# Patient Record
Sex: MALE | Race: WHITE | NOT HISPANIC OR LATINO | URBAN - METROPOLITAN AREA
[De-identification: names, ages, dates, MRNs, and addresses within clinical notes are randomized per-mention and may not be internally consistent; named-entity substitution may affect disease eponyms.]

---

## 2017-10-31 ENCOUNTER — INPATIENT (INPATIENT)
Facility: HOSPITAL | Age: 27
LOS: 2 days | Discharge: HOME | DRG: 885 | End: 2017-11-03
Attending: STUDENT IN AN ORGANIZED HEALTH CARE EDUCATION/TRAINING PROGRAM | Admitting: STUDENT IN AN ORGANIZED HEALTH CARE EDUCATION/TRAINING PROGRAM
Payer: COMMERCIAL

## 2017-10-31 VITALS
HEART RATE: 81 BPM | SYSTOLIC BLOOD PRESSURE: 117 MMHG | OXYGEN SATURATION: 97 % | RESPIRATION RATE: 18 BRPM | DIASTOLIC BLOOD PRESSURE: 80 MMHG | TEMPERATURE: 98 F

## 2017-10-31 DIAGNOSIS — F14.10 COCAINE ABUSE, UNCOMPLICATED: ICD-10-CM

## 2017-10-31 DIAGNOSIS — F11.90 OPIOID USE, UNSPECIFIED, UNCOMPLICATED: ICD-10-CM

## 2017-10-31 DIAGNOSIS — R69 ILLNESS, UNSPECIFIED: ICD-10-CM

## 2017-10-31 DIAGNOSIS — F33.2 MAJOR DEPRESSIVE DISORDER, RECURRENT SEVERE WITHOUT PSYCHOTIC FEATURES: ICD-10-CM

## 2017-10-31 DIAGNOSIS — F10.99 ALCOHOL USE, UNSPECIFIED WITH UNSPECIFIED ALCOHOL-INDUCED DISORDER: ICD-10-CM

## 2017-10-31 LAB
ANION GAP SERPL CALC-SCNC: 9 MMOL/L — SIGNIFICANT CHANGE UP (ref 5–17)
APAP SERPL-MCNC: <15 UG/ML — SIGNIFICANT CHANGE UP (ref 10–30)
APPEARANCE UR: CLEAR — SIGNIFICANT CHANGE UP
BASOPHILS NFR BLD AUTO: 0.2 % — SIGNIFICANT CHANGE UP (ref 0–2)
BILIRUB UR-MCNC: NEGATIVE — SIGNIFICANT CHANGE UP
BUN SERPL-MCNC: 12 MG/DL — SIGNIFICANT CHANGE UP (ref 7–23)
CALCIUM SERPL-MCNC: 8.4 MG/DL — SIGNIFICANT CHANGE UP (ref 8.4–10.5)
CHLORIDE SERPL-SCNC: 103 MMOL/L — SIGNIFICANT CHANGE UP (ref 96–108)
CO2 SERPL-SCNC: 24 MMOL/L — SIGNIFICANT CHANGE UP (ref 22–31)
COLOR SPEC: YELLOW — SIGNIFICANT CHANGE UP
CREAT SERPL-MCNC: 0.76 MG/DL — SIGNIFICANT CHANGE UP (ref 0.5–1.3)
DIFF PNL FLD: NEGATIVE — SIGNIFICANT CHANGE UP
EOSINOPHIL NFR BLD AUTO: 0.7 % — SIGNIFICANT CHANGE UP (ref 0–6)
ETHANOL SERPL-MCNC: <10 MG/DL — SIGNIFICANT CHANGE UP (ref 0–10)
GLUCOSE SERPL-MCNC: 97 MG/DL — SIGNIFICANT CHANGE UP (ref 70–99)
GLUCOSE UR QL: NEGATIVE — SIGNIFICANT CHANGE UP
HCT VFR BLD CALC: 38.4 % — LOW (ref 39–50)
HGB BLD-MCNC: 12.6 G/DL — LOW (ref 13–17)
KETONES UR-MCNC: NEGATIVE — SIGNIFICANT CHANGE UP
LEUKOCYTE ESTERASE UR-ACNC: NEGATIVE — SIGNIFICANT CHANGE UP
LYMPHOCYTES # BLD AUTO: 21.1 % — SIGNIFICANT CHANGE UP (ref 13–44)
MCHC RBC-ENTMCNC: 29 PG — SIGNIFICANT CHANGE UP (ref 27–34)
MCHC RBC-ENTMCNC: 32.8 G/DL — SIGNIFICANT CHANGE UP (ref 32–36)
MCV RBC AUTO: 88.5 FL — SIGNIFICANT CHANGE UP (ref 80–100)
MONOCYTES NFR BLD AUTO: 16.3 % — HIGH (ref 2–14)
NEUTROPHILS NFR BLD AUTO: 61.7 % — SIGNIFICANT CHANGE UP (ref 43–77)
NITRITE UR-MCNC: NEGATIVE — SIGNIFICANT CHANGE UP
PCP SPEC-MCNC: SIGNIFICANT CHANGE UP
PH UR: 6 — SIGNIFICANT CHANGE UP (ref 5–8)
PLATELET # BLD AUTO: 262 K/UL — SIGNIFICANT CHANGE UP (ref 150–400)
POTASSIUM SERPL-MCNC: 3.9 MMOL/L — SIGNIFICANT CHANGE UP (ref 3.5–5.3)
POTASSIUM SERPL-SCNC: 3.9 MMOL/L — SIGNIFICANT CHANGE UP (ref 3.5–5.3)
PROT UR-MCNC: NEGATIVE MG/DL — SIGNIFICANT CHANGE UP
RBC # BLD: 4.34 M/UL — SIGNIFICANT CHANGE UP (ref 4.2–5.8)
RBC # FLD: 14.1 % — SIGNIFICANT CHANGE UP (ref 10.3–16.9)
SALICYLATES SERPL-MCNC: <0.3 MG/DL — LOW (ref 2.8–20)
SODIUM SERPL-SCNC: 136 MMOL/L — SIGNIFICANT CHANGE UP (ref 135–145)
SP GR SPEC: 1.02 — SIGNIFICANT CHANGE UP (ref 1–1.03)
TSH SERPL-MCNC: 0.24 UIU/ML — LOW (ref 0.35–4.94)
UROBILINOGEN FLD QL: 0.2 E.U./DL — SIGNIFICANT CHANGE UP
WBC # BLD: 5.8 K/UL — SIGNIFICANT CHANGE UP (ref 3.8–10.5)
WBC # FLD AUTO: 5.8 K/UL — SIGNIFICANT CHANGE UP (ref 3.8–10.5)

## 2017-10-31 PROCEDURE — 99285 EMERGENCY DEPT VISIT HI MDM: CPT | Mod: 25

## 2017-10-31 PROCEDURE — 93010 ELECTROCARDIOGRAM REPORT: CPT

## 2017-10-31 PROCEDURE — 90792 PSYCH DIAG EVAL W/MED SRVCS: CPT

## 2017-10-31 RX ORDER — ACETAMINOPHEN 500 MG
650 TABLET ORAL EVERY 6 HOURS
Qty: 0 | Refills: 0 | Status: DISCONTINUED | OUTPATIENT
Start: 2017-10-31 | End: 2017-11-03

## 2017-10-31 RX ORDER — DIPHENHYDRAMINE HCL 50 MG
50 CAPSULE ORAL AT BEDTIME
Qty: 0 | Refills: 0 | Status: DISCONTINUED | OUTPATIENT
Start: 2017-10-31 | End: 2017-11-03

## 2017-10-31 RX ORDER — QUETIAPINE FUMARATE 200 MG/1
50 TABLET, FILM COATED ORAL EVERY 4 HOURS
Qty: 0 | Refills: 0 | Status: DISCONTINUED | OUTPATIENT
Start: 2017-10-31 | End: 2017-10-31

## 2017-10-31 RX ORDER — DIPHENHYDRAMINE HCL 50 MG
50 CAPSULE ORAL EVERY 4 HOURS
Qty: 0 | Refills: 0 | Status: DISCONTINUED | OUTPATIENT
Start: 2017-10-31 | End: 2017-10-31

## 2017-10-31 RX ORDER — QUETIAPINE FUMARATE 200 MG/1
50 TABLET, FILM COATED ORAL EVERY 4 HOURS
Qty: 0 | Refills: 0 | Status: DISCONTINUED | OUTPATIENT
Start: 2017-10-31 | End: 2017-11-03

## 2017-10-31 RX ADMIN — Medication 650 MILLIGRAM(S): at 22:19

## 2017-10-31 RX ADMIN — Medication 50 MILLIGRAM(S): at 22:19

## 2017-10-31 NOTE — ED BEHAVIORAL HEALTH ASSESSMENT NOTE - DESCRIPTION (FIRST USE, LAST USE, QUANTITY, FREQUENCY, DURATION)
Current smoker. Approached by supriya HANNAH regarding possible nicotine patch or gum. First drank ~15 years ago. Reports last drink was ~2 months ago (prior to rehab). Could drink up to 1 fifth of vodka daily. First used cocaine ~10 years ago, crack ~2 years ago. Reported last use was a few days ago. IVDA since ~1 year ago. Could use a few bags/day. Reported last use ~2 months ago (prior to rehab).

## 2017-10-31 NOTE — ED BEHAVIORAL HEALTH ASSESSMENT NOTE - AXIS IV
Problem related to social environment/Occupational problems/Housing problems/Problems with primary support/Problems with access to healthcare services

## 2017-10-31 NOTE — ED BEHAVIORAL HEALTH ASSESSMENT NOTE - LEGAL HISTORY
1 year in senior living ([facility]) initially for charge of burglary. Pt reports, "Everything was dismissed and the charges were downgraded to burglary.)

## 2017-10-31 NOTE — ED BEHAVIORAL HEALTH ASSESSMENT NOTE - SUMMARY
27-year-old English-born male, undomiciled, unemployed, who reports hx of depression, polysubstance use, 1 prior inpt psychiatric admission and 1 prior suicide attempt, now c/o suicidal ideation with plan to jump in front of subway train, as well as homicidal ideation with thoughts of pushing others in front of the train, in context of noncompliance with medication regimen (unknown time period), recent cocaine use and psychosocial stressors of homelessness and unemployment.    Pt's current mental status may be partially or more significantly substance-induced. In addition, pt's lack of collateral informants, inability/unwillingness to provide complete hx may be consistent with antisocial personality traits and/or malingering.    For that reason, will providing prn medications for irritability and/or combativeness, with plan to add mood stabilizer if indicated.

## 2017-10-31 NOTE — ED BEHAVIORAL HEALTH ASSESSMENT NOTE - RISK ASSESSMENT
Pt with suicidal and homicidal ideation regarding the subway. Wants to be admitted. Pt is irritable but not threatening. Is at low to moderate risk of harm to self or others on inpt unit.

## 2017-10-31 NOTE — ED BEHAVIORAL HEALTH ASSESSMENT NOTE - PSYCHIATRIC ISSUES AND PLAN (INCLUDE STANDING AND PRN MEDICATION)
Observe for need for standing rx (antidepressants, antipsychotics, mood stabilizers). For now, prn's of seroquel, lorazepam, benadryl.

## 2017-10-31 NOTE — ED ADULT NURSE NOTE - OBJECTIVE STATEMENT
Pt presents to ER c/o fever and body aches. Pt reports SI, denies HI. Vitals WNL, Pt is sleeping, appears comfortable. Given warm blankets. Pt was seen at a different ER approx 3 days ago for similar complaint. Will monitor and maintain safety.

## 2017-10-31 NOTE — ED ADULT NURSE NOTE - CHIEF COMPLAINT QUOTE
pt with fever, body aches reports was seen at [place] 3 days ago for the same thing. Now endorses SI with plan to jump in fron tof a train but decided not to.

## 2017-10-31 NOTE — ED ADULT TRIAGE NOTE - CHIEF COMPLAINT QUOTE
pt with fever, body aches reports was seen at [place] 3 days ago for the same thing. Now endorses SI with plan to jump in fron tof a train but decided not to.

## 2017-10-31 NOTE — ED BEHAVIORAL HEALTH ASSESSMENT NOTE - DIFFERENTIAL
MDD, recurrent, severe, without psychosis  Substance-Induced Mood Disorder  Polysubstance Dependence  Antisocial Personality Disorder

## 2017-10-31 NOTE — ED BEHAVIORAL HEALTH ASSESSMENT NOTE - OTHER PAST PSYCHIATRIC HISTORY (INCLUDE DETAILS REGARDING ONSET, COURSE OF ILLNESS, INPATIENT/OUTPATIENT TREATMENT)
As above.  Reports 1 prior inpt admt in [facility] ([place]) a few years ago. Rx'ed with Wellbutrin, Trazodone, Ativan prn. Did not follow up after discharge.  Reports 1 prior SA (tried to hang himself) a couple of years ago. As above.  Reports 1 prior inpt admt in [facility] ([place]) a few years ago. Rx'ed with Wellbutrin, Trazodone, Ativan prn. Did not follow up after discharge.  Also reports prior rx with seroquel and Prozac  Reports 1 prior SA (tried to hang himself) a couple of years ago. As above.  In addition to above hx, pt reports he has experienced panic attacks in the past.  Reports 1 prior inpt admt in [facility] ([facility]) a few years ago. Rx'ed with Wellbutrin, Trazodone, Ativan prn. Did not follow up after discharge.  Also reports prior rx with seroquel and Prozac.  Reports 1 prior SA (tried to hang himself) a couple of years ago.

## 2017-10-31 NOTE — ED BEHAVIORAL HEALTH ASSESSMENT NOTE - HPI (INCLUDE ILLNESS QUALITY, SEVERITY, DURATION, TIMING, CONTEXT, MODIFYING FACTORS, ASSOCIATED SIGNS AND SYMPTOMS)
Briefly, pt is 27-year-old English-born male, undomiciled, unemployed, who reports hx of depression, polysubstance use, 1 prior inpt psychiatric admission and 1 prior suicide attempt, now c/o suicidal ideation with plan to jump in front of subway train, as well as homicidal ideation with thoughts of pushing others in front of the train.  Pt, who is very irritable on approach, endorses Briefly, pt is 27-year-old English-born male, undomiciled, unemployed, who reports hx of depression, polysubstance use, 1 prior inpt psychiatric admission and 1 prior suicide attempt, now c/o suicidal ideation with plan to jump in front of subway train, as well as homicidal ideation with thoughts of pushing others in front of the train.  Pt, who is very irritable on approach. In addition to suicidal ideation with plan, endorses multiple sx of depression including hopelessness, depressed mood, anhedonia, anergy, decreased concentration, hypersomnia, but denies appetite problems or tearfulness.  Pt reports he's been homeless for 1 year. Also reports he hasn't worked since losing his job (in [place]) ~2 years ago. Briefly, pt is 27-year-old English-born male, undomiciled, unemployed, who reports hx of depression, polysubstance use, 1 prior inpt psychiatric admission and 1 prior suicide attempt, now c/o suicidal ideation with plan to jump in front of subway train, as well as homicidal ideation with thoughts of pushing others in front of the train.    Pt is very irritable on approach, minimally engageable. At one point during interview, he gritted his teeth and said, "How many of these stupid questions do I need to answer?!?!" When informed that questions are required for proper evaluation and possible admission, pt reluctantly complied with rest of interview.    In addition to suicidal ideation with plan, endorses multiple sx of depression including hopelessness, depressed mood, anhedonia, anergy, decreased concentration, hypersomnia, but denies appetite problems or tearfulness.    Pt reports he's been homeless for 1 year. Also reports he hasn't worked since losing his job (in [place]) ~2 years ago. Pt states he has no friends, no family, thus cannot provide any contacts for collateral information.    Pt reports he was in 28-day rehab at [facility] ~2 months ago, states he has been essentially clean and sober since then, but then added, "I did use 1 line of coke a few days ago." (N.B.: U tox + for cocaine and benzos.)

## 2017-10-31 NOTE — ED PROVIDER NOTE - ATTENDING CONTRIBUTION TO CARE
27 M hx of mental health d/o, incarceration in past BIBEMS after patient called for malaise but also wants to kill self.  Patient is homeless and is depressed with plan to jump off building.  Pt does not want to cooperate with hx and pe.  Just wants to sleep.  Exam unremarkable.  + SI.  Plan labs, urine, psych consult and reassess need for psych admission and or acute medical condition present.

## 2017-10-31 NOTE — ED BEHAVIORAL HEALTH ASSESSMENT NOTE - DESCRIPTION
Sitting in chair, eating N/A Born in [place]. Born in [place]. High school graduate; did not attend university. Reports his parents disowned him. 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 Has not worked x 2 years.

## 2017-10-31 NOTE — ED PROVIDER NOTE - OBJECTIVE STATEMENT
26 y/o m presents stating he has been having body aches, chills for the past day, also having thoughts of committing suicide.  Pt stating he has been incarcerated recently, is depressed, currently homeless, thinking of jumping off of a building.  Pt denies fever, n/v, CP, SOB, all other ROS negative.

## 2017-10-31 NOTE — ED PROVIDER NOTE - MEDICAL DECISION MAKING DETAILS
26 y/o m reporting nonspecific body aches, is SI; labs wnl, vss, afebrile, no evidence of infection, pt cleared medically.  Psych evaluated, will admit.

## 2017-11-01 DIAGNOSIS — Z76.5 MALINGERER [CONSCIOUS SIMULATION]: ICD-10-CM

## 2017-11-01 PROCEDURE — 99223 1ST HOSP IP/OBS HIGH 75: CPT

## 2017-11-01 RX ORDER — RISPERIDONE 4 MG/1
1 TABLET ORAL ONCE
Qty: 0 | Refills: 0 | Status: COMPLETED | OUTPATIENT
Start: 2017-11-01 | End: 2017-11-01

## 2017-11-01 RX ORDER — LOPERAMIDE HCL 2 MG
2 TABLET ORAL
Qty: 0 | Refills: 0 | Status: DISCONTINUED | OUTPATIENT
Start: 2017-11-01 | End: 2017-11-03

## 2017-11-01 RX ORDER — RISPERIDONE 4 MG/1
1 TABLET ORAL
Qty: 0 | Refills: 0 | Status: DISCONTINUED | OUTPATIENT
Start: 2017-11-01 | End: 2017-11-02

## 2017-11-01 RX ORDER — HALOPERIDOL DECANOATE 100 MG/ML
5 INJECTION INTRAMUSCULAR EVERY 6 HOURS
Qty: 0 | Refills: 0 | Status: DISCONTINUED | OUTPATIENT
Start: 2017-11-01 | End: 2017-11-03

## 2017-11-01 RX ORDER — NICOTINE POLACRILEX 2 MG
2 GUM BUCCAL
Qty: 0 | Refills: 0 | Status: DISCONTINUED | OUTPATIENT
Start: 2017-11-01 | End: 2017-11-03

## 2017-11-01 RX ADMIN — Medication 2 MILLIGRAM(S): at 18:37

## 2017-11-01 RX ADMIN — Medication 2 MILLIGRAM(S): at 22:35

## 2017-11-01 RX ADMIN — Medication 2 MILLIGRAM(S): at 23:50

## 2017-11-01 RX ADMIN — QUETIAPINE FUMARATE 50 MILLIGRAM(S): 200 TABLET, FILM COATED ORAL at 22:34

## 2017-11-01 NOTE — BEHAVIORAL HEALTH ASSESSMENT NOTE - NSBHCHARTREVIEWLAB_PSY_A_CORE FT
Urinalysis Basic - ( 31 Oct 2017 13:57 )    Color: Yellow / Appearance: Clear / S.020 / pH: x  Gluc: x / Ketone: NEGATIVE  / Bili: Negative / Urobili: 0.2 E.U./dL   Blood: x / Protein: NEGATIVE mg/dL / Nitrite: NEGATIVE   Leuk Esterase: NEGATIVE / RBC: x / WBC x   Sq Epi: x / Non Sq Epi: x / Bacteria: x    TSH 0.237  Utox +benzo, +cocaine
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
## 2017-11-01 NOTE — BEHAVIORAL HEALTH ASSESSMENT NOTE - NSBHCHARTREVIEWVS_PSY_A_CORE FT
Vital Signs Last 24 Hrs  T(C): 36.6 (01 Nov 2017 06:26), Max: 36.6 (01 Nov 2017 06:26)  T(F): 97.8 (01 Nov 2017 06:26), Max: 97.8 (01 Nov 2017 06:26)  HR: 71 (01 Nov 2017 06:26) (71 - 71)  BP: 105/65 (01 Nov 2017 06:26) (105/65 - 105/65)  BP(mean): --  RR: 18 (01 Nov 2017 06:26) (18 - 18)  SpO2: --

## 2017-11-01 NOTE — BEHAVIORAL HEALTH ASSESSMENT NOTE - HPI (INCLUDE ILLNESS QUALITY, SEVERITY, DURATION, TIMING, CONTEXT, MODIFYING FACTORS, ASSOCIATED SIGNS AND SYMPTOMS)
Patient is a 27 year old  male undomiciled, unemployed, native to [place], but last domiciled in [place] 2 years ago. He reports psychiatric history of depression, polysubstance use, panic attacks and ADHD. Patient self-presented to ED with suicidal ideation with plan to jump in front of a train and homicidal ideation with thoughts of pushing others in front of a train.    Patient is noted to be extremely irritable, several attempts were made to speak to patient. He states that he has been feeling depressed for 'a long time.' He says "When I feel depressed, I get withdrawn and I isolate." He reported that he was last on Wellbutrin 100mg BID, Trazodone, Ativan and Adderall over two months ago. He reported recently using cocaine prior to admission. His utox was also positive for benzodiazepines and he reported that he was recently hospitalized at either [facility] or [facility] and given Librium there. He reported not being on any other medications during his hospitalization. He began irate when additional questions were asked and walked out of the interview.     Per ED admission note, patient was also noted to be very irritable, though somewhat cooperative. He endorsed symptoms of depression including feelings of hopelessness, low energy, poor concentration and hypersomnia. He has no contacts or sources of support. He also stated that he was in a 28 day day rehab at [facility] 2 months ago and recently did one line of cocaine a few days ago.

## 2017-11-01 NOTE — BEHAVIORAL HEALTH ASSESSMENT NOTE - DESCRIPTION (FIRST USE, LAST USE, QUANTITY, FREQUENCY, DURATION)
First drank ~15 years ago. Reports last drink was ~2 months ago (prior to rehab). Could drink up to 1 fifth of vodka daily. First used cocaine ~10 years ago, crack ~2 years ago. Reported last use was a few days ago. IVDA since ~1 year ago. Could use a few bags/day. Reported last use ~2 months ago (prior to rehab). Per staff report, patient is a smoker

## 2017-11-01 NOTE — BEHAVIORAL HEALTH ASSESSMENT NOTE - SUMMARY
27-year-old English-born male, undomiciled, unemployed, who reports hx of depression, polysubstance use, 1 prior inpt psychiatric admission and 1 prior suicide attempt, now c/o suicidal ideation with plan to jump in front of subway train, as well as homicidal ideation with thoughts of pushing others in front of the train, in context of noncompliance with medication regimen (unknown time period), recent cocaine use and psychosocial stressors of homelessness and unemployment.    Patient is extremely irritable and uncooperative with interview. Will start Risperdal 1mg BID for mood stabilization and irritability. Will assess and evaluate tomorrow.

## 2017-11-01 NOTE — BEHAVIORAL HEALTH ASSESSMENT NOTE - TREATMENT
[facility] 28-day rehab ~2 months ago [facility] 28-day rehab ~2 months ago [facility] 28-day rehab ~2 months ago

## 2017-11-02 VITALS
TEMPERATURE: 98 F | SYSTOLIC BLOOD PRESSURE: 85 MMHG | RESPIRATION RATE: 18 BRPM | HEART RATE: 78 BPM | DIASTOLIC BLOOD PRESSURE: 50 MMHG

## 2017-11-02 DIAGNOSIS — F19.94 OTHER PSYCHOACTIVE SUBSTANCE USE, UNSPECIFIED WITH PSYCHOACTIVE SUBSTANCE-INDUCED MOOD DISORDER: ICD-10-CM

## 2017-11-02 PROCEDURE — 80048 BASIC METABOLIC PNL TOTAL CA: CPT

## 2017-11-02 PROCEDURE — 99285 EMERGENCY DEPT VISIT HI MDM: CPT | Mod: 25

## 2017-11-02 PROCEDURE — 84443 ASSAY THYROID STIM HORMONE: CPT

## 2017-11-02 PROCEDURE — 81003 URINALYSIS AUTO W/O SCOPE: CPT

## 2017-11-02 PROCEDURE — 80307 DRUG TEST PRSMV CHEM ANLYZR: CPT

## 2017-11-02 PROCEDURE — 36415 COLL VENOUS BLD VENIPUNCTURE: CPT

## 2017-11-02 PROCEDURE — 99232 SBSQ HOSP IP/OBS MODERATE 35: CPT

## 2017-11-02 PROCEDURE — 93005 ELECTROCARDIOGRAM TRACING: CPT

## 2017-11-02 PROCEDURE — 85025 COMPLETE CBC W/AUTO DIFF WBC: CPT

## 2017-11-02 RX ORDER — OLANZAPINE 15 MG/1
5 TABLET, FILM COATED ORAL AT BEDTIME
Qty: 0 | Refills: 0 | Status: DISCONTINUED | OUTPATIENT
Start: 2017-11-02 | End: 2017-11-03

## 2017-11-02 RX ORDER — OLANZAPINE 15 MG/1
5 TABLET, FILM COATED ORAL ONCE
Qty: 0 | Refills: 0 | Status: DISCONTINUED | OUTPATIENT
Start: 2017-11-02 | End: 2017-11-02

## 2017-11-02 RX ORDER — OLANZAPINE 15 MG/1
5 TABLET, FILM COATED ORAL ONCE
Qty: 0 | Refills: 0 | Status: DISCONTINUED | OUTPATIENT
Start: 2017-11-02 | End: 2017-11-03

## 2017-11-02 RX ADMIN — QUETIAPINE FUMARATE 50 MILLIGRAM(S): 200 TABLET, FILM COATED ORAL at 22:00

## 2017-11-02 RX ADMIN — OLANZAPINE 5 MILLIGRAM(S): 15 TABLET, FILM COATED ORAL at 22:01

## 2017-11-02 RX ADMIN — Medication 2 MILLIGRAM(S): at 22:04

## 2017-11-02 RX ADMIN — Medication 50 MILLIGRAM(S): at 22:04

## 2017-11-02 RX ADMIN — Medication 2 MILLIGRAM(S): at 14:13

## 2017-11-02 RX ADMIN — QUETIAPINE FUMARATE 50 MILLIGRAM(S): 200 TABLET, FILM COATED ORAL at 14:13

## 2017-11-03 PROCEDURE — 99238 HOSP IP/OBS DSCHRG MGMT 30/<: CPT

## 2017-11-03 RX ORDER — NICOTINE POLACRILEX 2 MG
0 GUM BUCCAL
Qty: 0 | Refills: 0 | COMMUNITY
Start: 2017-11-03

## 2017-11-03 RX ORDER — NICOTINE POLACRILEX 2 MG
1 GUM BUCCAL
Qty: 30 | Refills: 0 | OUTPATIENT
Start: 2017-11-03 | End: 2017-12-03

## 2017-11-03 RX ORDER — QUETIAPINE FUMARATE 200 MG/1
1 TABLET, FILM COATED ORAL
Qty: 56 | Refills: 0 | OUTPATIENT
Start: 2017-11-03 | End: 2017-11-17

## 2017-11-03 RX ORDER — QUETIAPINE FUMARATE 200 MG/1
1 TABLET, FILM COATED ORAL
Qty: 0 | Refills: 0 | COMMUNITY
Start: 2017-11-03

## 2017-11-03 NOTE — PROGRESS NOTE BEHAVIORAL HEALTH - NSBHCHARTREVIEWVS_PSY_A_CORE FT
Vital Signs Last 24 Hrs  T(C): --  T(F): --  HR: --  BP: --  BP(mean): --  RR: --  SpO2: --  Patient refused
Vital Signs Last 24 Hrs  T(C): 36.6 (02 Nov 2017 06:20), Max: 36.9 (01 Nov 2017 16:36)  T(F): 97.8 (02 Nov 2017 06:20), Max: 98.4 (01 Nov 2017 16:36)  HR: 78 (02 Nov 2017 06:20) (78 - 83)  BP: 85/50 (02 Nov 2017 06:20) (85/50 - 110/63)  BP(mean): --  RR: 18 (02 Nov 2017 06:20) (18 - 18)  SpO2: --

## 2017-11-03 NOTE — PROGRESS NOTE BEHAVIORAL HEALTH - PROBLEM SELECTOR PLAN 2
Risperdal d/c'd  Zyprexa 5mg QHS initiated  Seroquel 50mg PRN aggression
Risperdal d/c'd  Zyprexa 5mg QHS initiated  Seroquel 50mg PRN aggression

## 2017-11-03 NOTE — PROGRESS NOTE BEHAVIORAL HEALTH - PRIMARY DX
Severe episode of recurrent major depressive disorder, without psychotic features
Severe episode of recurrent major depressive disorder, without psychotic features

## 2017-11-03 NOTE — PROGRESS NOTE BEHAVIORAL HEALTH - NSBHCHARTREVIEWLAB_PSY_A_CORE FT
Urinalysis Basic - ( 31 Oct 2017 13:57 )    Color: Yellow / Appearance: Clear / S.020 / pH: x  Gluc: x / Ketone: NEGATIVE  / Bili: Negative / Urobili: 0.2 E.U./dL   Blood: x / Protein: NEGATIVE mg/dL / Nitrite: NEGATIVE   Leuk Esterase: NEGATIVE / RBC: x / WBC x   Sq Epi: x / Non Sq Epi: x / Bacteria: x    TSH 0.237  Utox +benzo, +cocaine
Urinalysis Basic - ( 31 Oct 2017 13:57 )    Color: Yellow / Appearance: Clear / S.020 / pH: x  Gluc: x / Ketone: NEGATIVE  / Bili: Negative / Urobili: 0.2 E.U./dL   Blood: x / Protein: NEGATIVE mg/dL / Nitrite: NEGATIVE   Leuk Esterase: NEGATIVE / RBC: x / WBC x   Sq Epi: x / Non Sq Epi: x / Bacteria: x    TSH 0.237  Utox +benzo, +cocaine

## 2017-11-03 NOTE — PROGRESS NOTE BEHAVIORAL HEALTH - SUMMARY
27-year-old English-born male, undomiciled, unemployed, who reports hx of depression, polysubstance use, 1 prior inpt psychiatric admission and 1 prior suicide attempt, now c/o suicidal ideation with plan to jump in front of subway train, as well as homicidal ideation with thoughts of pushing others in front of the train, in context of noncompliance with medication regimen (unknown time period), recent cocaine use and psychosocial stressors of homelessness and unemployment.    Patient denies thought of suicide, death or harm towards others. Is most likely irritable due to personality and recent substance use. Is refused recommended treatment at hospital and requesting discharge due to reported improvement in mood and desire to continue outpatient care with his current MD. Will plan for discharge
27-year-old English-born male, undomiciled, unemployed, who reports hx of depression, polysubstance use, 1 prior inpt psychiatric admission and 1 prior suicide attempt, now c/o suicidal ideation with plan to jump in front of subway train, as well as homicidal ideation with thoughts of pushing others in front of the train, in context of noncompliance with medication regimen (unknown time period), recent cocaine use and psychosocial stressors of homelessness and unemployment.    Patient has been refusing prescribed Risperdal, now reporting that he has an allergy with reaction of 'hives.' He is irritable, most likely due to his substance use. He has not expressed si/hi during admission, will continue to observe and evaluate. He submitted a 72 hour letter requesting discharge.

## 2017-11-03 NOTE — PROGRESS NOTE BEHAVIORAL HEALTH - NSBHADMITCOUNSEL_PSY_A_CORE
client/family/caregiver education/diagnostic results/impressions and/or recommended studies/importance of adherence to chosen treatment
diagnostic results/impressions and/or recommended studies/client/family/caregiver education/importance of adherence to chosen treatment

## 2017-11-03 NOTE — PROGRESS NOTE BEHAVIORAL HEALTH - NSBHFUPINTERVALHXFT_PSY_A_CORE
Patient is noted to be irritable and devaluing, refusing prescribed medications, reporting that he gets 'hives' with Risperdal and also has an allergic reaction to Haldol. He demands to receive Ativan and Wellbutrin for his depression, but is uncooperative with treatment plan, refusing to provide more history. He was initially agreeable with staff getting prior medical records from other hospitals, then became suspicious and refused to provide collateral sources. 'Why do you need to speak to someone else, when I can tell you what works for me?" When asked about his substance you and if he was experiencing any withdrawals'; he denied currently experiencing any symptoms. Unable to assess si/hi as patient became dismissive "you're dismissed now'' and refused to continue conversation. No evidence of a/v hallucinations, paranoia. Patient does not appear to be responding to internal stimuli.   Per staff report, patient became upset and irate demanding to receive Ativan, upon refusal he began slamming doors before walking away.
Patient is seen in his room, he requests to be discharged ,stating that he no longer has si/hi. He denies a/v hallucinations or paranoia and states that he has an outpatient psychiatrist that he will continue to go to on his own. He continues to refuse collateral. Case discussed with attending.

## 2017-11-06 DIAGNOSIS — F14.10 COCAINE ABUSE, UNCOMPLICATED: ICD-10-CM

## 2017-11-06 DIAGNOSIS — F33.2 MAJOR DEPRESSIVE DISORDER, RECURRENT SEVERE WITHOUT PSYCHOTIC FEATURES: ICD-10-CM

## 2017-11-06 DIAGNOSIS — Z91.14 PATIENT'S OTHER NONCOMPLIANCE WITH MEDICATION REGIMEN: ICD-10-CM

## 2017-11-06 DIAGNOSIS — F19.94 OTHER PSYCHOACTIVE SUBSTANCE USE, UNSPECIFIED WITH PSYCHOACTIVE SUBSTANCE-INDUCED MOOD DISORDER: ICD-10-CM

## 2017-11-06 DIAGNOSIS — R45.851 SUICIDAL IDEATIONS: ICD-10-CM

## 2017-11-06 RX ORDER — QUETIAPINE FUMARATE 200 MG/1
2 TABLET, FILM COATED ORAL
Qty: 56 | Refills: 0 | OUTPATIENT
Start: 2017-11-06 | End: 2017-11-20

## 2017-12-10 ENCOUNTER — EMERGENCY (EMERGENCY)
Facility: HOSPITAL | Age: 27
LOS: 1 days | Discharge: HOME | End: 2017-12-10
Admitting: EMERGENCY MEDICINE
Payer: MEDICAID

## 2017-12-10 VITALS
HEART RATE: 91 BPM | DIASTOLIC BLOOD PRESSURE: 95 MMHG | RESPIRATION RATE: 16 BRPM | TEMPERATURE: 98 F | SYSTOLIC BLOOD PRESSURE: 147 MMHG | OXYGEN SATURATION: 98 %

## 2017-12-10 PROCEDURE — 99284 EMERGENCY DEPT VISIT MOD MDM: CPT | Mod: 25

## 2017-12-10 RX ORDER — IBUPROFEN 200 MG
800 TABLET ORAL ONCE
Qty: 0 | Refills: 0 | Status: COMPLETED | OUTPATIENT
Start: 2017-12-10 | End: 2017-12-10

## 2017-12-10 RX ORDER — ONDANSETRON 8 MG/1
8 TABLET, FILM COATED ORAL ONCE
Qty: 0 | Refills: 0 | Status: COMPLETED | OUTPATIENT
Start: 2017-12-10 | End: 2017-12-10

## 2017-12-10 RX ADMIN — ONDANSETRON 8 MILLIGRAM(S): 8 TABLET, FILM COATED ORAL at 07:18

## 2017-12-10 RX ADMIN — Medication 800 MILLIGRAM(S): at 07:18

## 2017-12-10 NOTE — ED PROVIDER NOTE - OBJECTIVE STATEMENT
28 yo M with PMHx of IVDU on methadone 40mg daily, presenting c/o 26 yo M with PMHx of IVDU on methadone 40mg daily, undomiciled, presenting c/o not feeling well with nausea and abdominal cramps after missing his methadone dose for the past 2d.  Also reports having blisters to b/l sole with increased pain after prolonged walking.  Denies trauma, fall, HA, dizziness, bleeding, V/D/C, CP, SOB, palpitations, tremors, fever, chills, redness, change in urinary/bowel function, and abdominal pain.

## 2017-12-10 NOTE — ED ADULT NURSE NOTE - CHIEF COMPLAINT QUOTE
"im not feeling well and I missed my methadone dose on Saturday"; picked up from [facility]; ambulatory on scene; also c/o blisters on both feet

## 2017-12-10 NOTE — ED PROVIDER NOTE - MEDICAL DECISION MAKING DETAILS
AFVSS at time of d/c, pt non-toxic appearing and hemodynamically stable, results, ddx, and f/u plans discussed with pt at bedside, d/c'd home to f/u with PMD, strict return precautions discussed, prompt return to ER for any worsening or new sx, pt verbalized understanding. AFVSS at time of d/c, pt non-toxic appearing, wound care instructions provided, given vaseline and NSAID, d/c to f/u with podiatry

## 2017-12-10 NOTE — ED PROVIDER NOTE - PHYSICAL EXAMINATION
Gen - Disheveled M, NAD, comfortable in stretcher,  non-toxic appearing  Skin - warm, dry, macerated skin to b/l sole with mild edema, no fluctuance, ulcerations, d/c, bleeding, or warmth   CV - S1S2, R/R/R  Resp - CTAB, no r/r/w   Abd - NABS, soft, NT, ND   MS - w/w/p, palpable distal pulses b/l, brisk cap refill   Neuro - AxOx3, ambulatory with mild limp

## 2017-12-10 NOTE — ED ADULT TRIAGE NOTE - CHIEF COMPLAINT QUOTE
"im not feeling well and I missed my methadone dose on Saturday"; picked up from [facility]; ambulatory on scene; also c/o blisters on both feet

## 2017-12-14 DIAGNOSIS — F11.23 OPIOID DEPENDENCE WITH WITHDRAWAL: ICD-10-CM

## 2017-12-14 DIAGNOSIS — Z79.899 OTHER LONG TERM (CURRENT) DRUG THERAPY: ICD-10-CM

## 2017-12-14 DIAGNOSIS — S90.822A BLISTER (NONTHERMAL), LEFT FOOT, INITIAL ENCOUNTER: ICD-10-CM

## 2017-12-14 DIAGNOSIS — S90.821A BLISTER (NONTHERMAL), RIGHT FOOT, INITIAL ENCOUNTER: ICD-10-CM

## 2017-12-14 DIAGNOSIS — F17.200 NICOTINE DEPENDENCE, UNSPECIFIED, UNCOMPLICATED: ICD-10-CM
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
## 2017-12-14 DIAGNOSIS — R10.9 UNSPECIFIED ABDOMINAL PAIN: ICD-10-CM
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
## 2018-03-02 ENCOUNTER — EMERGENCY (EMERGENCY)
Facility: HOSPITAL | Age: 28
LOS: 1 days | Discharge: HOME | End: 2018-03-02
Admitting: EMERGENCY MEDICINE
Payer: MEDICAID

## 2018-03-02 VITALS
HEART RATE: 82 BPM | DIASTOLIC BLOOD PRESSURE: 79 MMHG | SYSTOLIC BLOOD PRESSURE: 121 MMHG | OXYGEN SATURATION: 97 % | RESPIRATION RATE: 16 BRPM | TEMPERATURE: 97 F

## 2018-03-02 VITALS
SYSTOLIC BLOOD PRESSURE: 125 MMHG | TEMPERATURE: 98 F | OXYGEN SATURATION: 98 % | RESPIRATION RATE: 16 BRPM | HEART RATE: 81 BPM | DIASTOLIC BLOOD PRESSURE: 88 MMHG

## 2018-03-02 LAB
ALBUMIN SERPL ELPH-MCNC: 3.4 G/DL — SIGNIFICANT CHANGE UP (ref 3.4–5)
ALP SERPL-CCNC: 79 U/L — SIGNIFICANT CHANGE UP (ref 40–120)
ALT FLD-CCNC: 43 U/L — HIGH (ref 12–42)
ANION GAP SERPL CALC-SCNC: 11 MMOL/L — SIGNIFICANT CHANGE UP (ref 9–16)
AST SERPL-CCNC: 51 U/L — HIGH (ref 15–37)
BASOPHILS NFR BLD AUTO: 0.9 % — SIGNIFICANT CHANGE UP (ref 0–2)
BILIRUB SERPL-MCNC: 0.3 MG/DL — SIGNIFICANT CHANGE UP (ref 0.2–1.2)
BUN SERPL-MCNC: 14 MG/DL — SIGNIFICANT CHANGE UP (ref 7–23)
CALCIUM SERPL-MCNC: 8.5 MG/DL — SIGNIFICANT CHANGE UP (ref 8.5–10.5)
CHLORIDE SERPL-SCNC: 106 MMOL/L — SIGNIFICANT CHANGE UP (ref 96–108)
CO2 SERPL-SCNC: 22 MMOL/L — SIGNIFICANT CHANGE UP (ref 22–31)
CREAT SERPL-MCNC: 0.52 MG/DL — SIGNIFICANT CHANGE UP (ref 0.5–1.3)
EOSINOPHIL NFR BLD AUTO: 3.1 % — SIGNIFICANT CHANGE UP (ref 0–6)
GLUCOSE SERPL-MCNC: 90 MG/DL — SIGNIFICANT CHANGE UP (ref 70–99)
HCT VFR BLD CALC: 38.8 % — LOW (ref 39–50)
HGB BLD-MCNC: 11.8 G/DL — LOW (ref 13–17)
IMM GRANULOCYTES NFR BLD AUTO: 0.3 % — SIGNIFICANT CHANGE UP (ref 0–1.5)
LIDOCAIN IGE QN: 188 U/L — SIGNIFICANT CHANGE UP (ref 73–393)
LYMPHOCYTES # BLD AUTO: 28.7 % — SIGNIFICANT CHANGE UP (ref 13–44)
MCHC RBC-ENTMCNC: 25.4 PG — LOW (ref 27–34)
MCHC RBC-ENTMCNC: 30.4 G/DL — LOW (ref 32–36)
MCV RBC AUTO: 83.4 FL — SIGNIFICANT CHANGE UP (ref 80–100)
MONOCYTES NFR BLD AUTO: 12.1 % — SIGNIFICANT CHANGE UP (ref 2–14)
NEUTROPHILS NFR BLD AUTO: 54.9 % — SIGNIFICANT CHANGE UP (ref 43–77)
PLATELET # BLD AUTO: 281 K/UL — SIGNIFICANT CHANGE UP (ref 150–400)
POTASSIUM SERPL-MCNC: 4.4 MMOL/L — SIGNIFICANT CHANGE UP (ref 3.5–5.3)
POTASSIUM SERPL-SCNC: 4.4 MMOL/L — SIGNIFICANT CHANGE UP (ref 3.5–5.3)
PROT SERPL-MCNC: 6.9 G/DL — SIGNIFICANT CHANGE UP (ref 6.4–8.2)
RBC # BLD: 4.65 M/UL — SIGNIFICANT CHANGE UP (ref 4.2–5.8)
RBC # FLD: 15.6 % — SIGNIFICANT CHANGE UP (ref 10.3–16.9)
SODIUM SERPL-SCNC: 139 MMOL/L — SIGNIFICANT CHANGE UP (ref 132–145)
WBC # BLD: 5.7 K/UL — SIGNIFICANT CHANGE UP (ref 3.8–10.5)
WBC # FLD AUTO: 5.7 K/UL — SIGNIFICANT CHANGE UP (ref 3.8–10.5)

## 2018-03-02 PROCEDURE — 99284 EMERGENCY DEPT VISIT MOD MDM: CPT

## 2018-03-02 RX ORDER — ONDANSETRON 8 MG/1
4 TABLET, FILM COATED ORAL ONCE
Qty: 0 | Refills: 0 | Status: COMPLETED | OUTPATIENT
Start: 2018-03-02 | End: 2018-03-02

## 2018-03-02 RX ORDER — SODIUM CHLORIDE 9 MG/ML
1000 INJECTION INTRAMUSCULAR; INTRAVENOUS; SUBCUTANEOUS ONCE
Qty: 0 | Refills: 0 | Status: COMPLETED | OUTPATIENT
Start: 2018-03-02 | End: 2018-03-02

## 2018-03-02 RX ORDER — KETOROLAC TROMETHAMINE 30 MG/ML
30 SYRINGE (ML) INJECTION ONCE
Qty: 0 | Refills: 0 | Status: DISCONTINUED | OUTPATIENT
Start: 2018-03-02 | End: 2018-03-02

## 2018-03-02 RX ADMIN — ONDANSETRON 4 MILLIGRAM(S): 8 TABLET, FILM COATED ORAL at 10:44

## 2018-03-02 RX ADMIN — Medication 30 MILLIGRAM(S): at 10:44

## 2018-03-02 RX ADMIN — SODIUM CHLORIDE 1000 MILLILITER(S): 9 INJECTION INTRAMUSCULAR; INTRAVENOUS; SUBCUTANEOUS at 10:44

## 2018-03-02 NOTE — ED PROVIDER NOTE - MEDICAL DECISION MAKING DETAILS
Pt was given IVFs, Toradol and Zofran in ED. A&Ox3. NAD. Sitting comfortably with no other complaints at this time. AFVSS. Pt was given IVFs, Toradol and Zofran in ED. A&Ox3. NAD. Sitting comfortably with no other complaints at this time. AFVSS. Will D/C with supportive tx instructions and F/U with GI in 24-48 hours. Strict return precautions reviewed with pt in which pt verbalizes understanding and agrees to.

## 2018-03-02 NOTE — ED PROVIDER NOTE - OBJECTIVE STATEMENT
29 y/o undomiciled M with PMH of IVDA presents to ED c/o nonspecific lower abdominal pain since waking up this morning. Pain is moderate in nature with no aggravating/alleviating factors. He has not taken any medication for the pain prior to arrival. He denies having any associated sx's. His last BM was this morning with no recent change in BM patterns. Last PO food intake was last night which he tolerated well.     Denies fever, chills, dizziness, syncope, CP, SOB, palpitations, N/V/D/C, hematemesis, hematochezia, melena, back or flank pain, dysuria, hematuria

## 2018-03-06 DIAGNOSIS — R10.30 LOWER ABDOMINAL PAIN, UNSPECIFIED: ICD-10-CM

## 2018-03-06 DIAGNOSIS — Z79.899 OTHER LONG TERM (CURRENT) DRUG THERAPY: ICD-10-CM

## 2018-03-19 ENCOUNTER — EMERGENCY (EMERGENCY)
Facility: HOSPITAL | Age: 28
LOS: 1 days | Discharge: HOME | End: 2018-03-19
Attending: EMERGENCY MEDICINE | Admitting: EMERGENCY MEDICINE
Payer: MEDICAID

## 2018-03-19 VITALS
SYSTOLIC BLOOD PRESSURE: 121 MMHG | RESPIRATION RATE: 17 BRPM | HEART RATE: 108 BPM | TEMPERATURE: 99 F | DIASTOLIC BLOOD PRESSURE: 67 MMHG | OXYGEN SATURATION: 98 %

## 2018-03-19 VITALS
WEIGHT: 164.91 LBS | TEMPERATURE: 98 F | OXYGEN SATURATION: 98 % | DIASTOLIC BLOOD PRESSURE: 82 MMHG | RESPIRATION RATE: 16 BRPM | SYSTOLIC BLOOD PRESSURE: 133 MMHG | HEART RATE: 129 BPM

## 2018-03-19 DIAGNOSIS — R41.82 ALTERED MENTAL STATUS, UNSPECIFIED: ICD-10-CM

## 2018-03-19 DIAGNOSIS — Z79.899 OTHER LONG TERM (CURRENT) DRUG THERAPY: ICD-10-CM

## 2018-03-19 DIAGNOSIS — T40.1X1A POISONING BY HEROIN, ACCIDENTAL (UNINTENTIONAL), INITIAL ENCOUNTER: ICD-10-CM

## 2018-03-19 LAB — GLUCOSE BLDC GLUCOMTR-MCNC: 181 MG/DL — HIGH (ref 70–99)

## 2018-03-19 PROCEDURE — 99284 EMERGENCY DEPT VISIT MOD MDM: CPT | Mod: 25

## 2018-03-19 PROCEDURE — 93010 ELECTROCARDIOGRAM REPORT: CPT

## 2018-03-19 NOTE — ED PROVIDER NOTE - OBJECTIVE STATEMENT
28y white M with PMHx of heroin abuse, BIBA after being found unresponsive after shooting up heroin. Per EMS, pt had agonal respirations upon their arrival. Was given 4mg of Narcan, after which he regained consciousness. Pt arrives awake, alert but somnolent with O2sats of 98%. No physical complaints. No other PMHx.

## 2018-03-19 NOTE — ED ADULT TRIAGE NOTE - CHIEF COMPLAINT QUOTE
kanu, was found in the bathroom unresponsive- arrives A+Ox3 admits to using heroin and was  given 4mg IN of Narcan

## 2018-09-15 ENCOUNTER — ED HISTORICAL/CONVERTED ENCOUNTER (OUTPATIENT)
Dept: OTHER | Age: 28
End: 2018-09-15

## 2021-10-21 NOTE — ED PROVIDER NOTE - NS ED MD EM SELECTION
[id number] [facility]  Phone: (   )    -  Fax: (   )    -  Established Patient  Follow Up Time:    Risa Ordonez  [address] [address]  Phone: [phone]  Fax: (   )    -  Established Patient  Follow Up Time: 2 weeks

## 2023-04-26 NOTE — ED ADULT TRIAGE NOTE - MEANS OF ARRIVAL
wheelchair
I have personally seen and examined the patient. I have collaborated with and supervised the

## 2024-06-03 NOTE — ED PROVIDER NOTE - NS ED ATTENDING NAME FT
[facility] requesting metformin 1000mg #180. Cpx scheduled 10/2024. Rx sent.   Dr. Casper